# Patient Record
Sex: MALE | Race: WHITE | NOT HISPANIC OR LATINO | URBAN - METROPOLITAN AREA
[De-identification: names, ages, dates, MRNs, and addresses within clinical notes are randomized per-mention and may not be internally consistent; named-entity substitution may affect disease eponyms.]

---

## 2023-03-22 ENCOUNTER — OFFICE (OUTPATIENT)
Dept: URBAN - METROPOLITAN AREA CLINIC 105 | Facility: CLINIC | Age: 28
End: 2023-03-22

## 2023-03-22 VITALS
DIASTOLIC BLOOD PRESSURE: 58 MMHG | HEART RATE: 75 BPM | SYSTOLIC BLOOD PRESSURE: 109 MMHG | OXYGEN SATURATION: 98 % | HEIGHT: 69 IN | WEIGHT: 183 LBS

## 2023-03-22 DIAGNOSIS — K58.1 IRRITABLE BOWEL SYNDROME WITH CONSTIPATION: ICD-10-CM

## 2023-03-22 DIAGNOSIS — R10.9 UNSPECIFIED ABDOMINAL PAIN: ICD-10-CM

## 2023-03-22 DIAGNOSIS — R14.0 ABDOMINAL DISTENSION (GASEOUS): ICD-10-CM

## 2023-03-22 PROCEDURE — 99203 OFFICE O/P NEW LOW 30 MIN: CPT

## 2023-03-22 RX ORDER — FAMOTIDINE 40 MG/1
TABLET, FILM COATED ORAL
Qty: 90 | Refills: 1 | Status: ACTIVE
Start: 2023-03-22

## 2023-03-22 RX ORDER — HYOSCYAMINE SULFATE 0.12 MG/1
0.38 TABLET ORAL; SUBLINGUAL
Qty: 30 | Refills: 2 | Status: ACTIVE
Start: 2023-03-22

## 2023-03-22 NOTE — SERVICEHPINOTES
Misael Tatum   is seen for an initial visit today.  
br
brPatient is a 27-year-old man who presents today for evaluation of abdominal pain.brHe c/o left sided abdominal pain which is going on for about 4 weeks associated with bloating sensation. He experiences the pain especially after eating a meal. Patient feels like he cannot pass gas and it is trapped in there. His main issue is gaseous sensation, which exacerbates at night. Patient also feels rumbly in the stomach. He has issues with heartburn every day. He moves his bowels twice a day. Patient states his pressure mildly relieves after bowel movement. Patient reports the pain worsens when he is trying to go to bed. He has tried Gas-X, MiraLAX, and probiotic. Patient also tried bland diet for few days.brHe occasionally drinks alcohol. Does not smoke cigarettes. brDenies blood in stool. Denies family history of colon cancer. Denies family history of IBD, Crohn’s, and ulcerative colitis. Denies black or tarry stool. Denies trouble swallowing.

## 2023-03-22 NOTE — SERVICENOTES
– Ordered blood work
– Ordered breath test
– Try famotidine 40 mg daily as needed for heartburn
–Continue probiotic supplement daily
– Discussed low FODMAP diet for bloating
– Try IBgard (peppermint oil) as needed for bloating/abdominal pain, you can buy this in the drugstore or online if helpful
– If IBgard not helping, try hyoscyamine under the tongue every 8 hours as needed
-Discussed lifestyle modifications for reflux including eating small, frequent meals, elevating head of bed, not eating 3 hours before bedtime and avoiding trigger foods
-Placed referral to primary care
–Follow-up in 3 months

## 2023-10-30 ENCOUNTER — OFFICE (OUTPATIENT)
Dept: URBAN - METROPOLITAN AREA CLINIC 105 | Facility: CLINIC | Age: 28
End: 2023-10-30

## 2023-10-30 VITALS
OXYGEN SATURATION: 99 % | SYSTOLIC BLOOD PRESSURE: 115 MMHG | HEART RATE: 72 BPM | WEIGHT: 178 LBS | HEIGHT: 69 IN | DIASTOLIC BLOOD PRESSURE: 80 MMHG

## 2023-10-30 DIAGNOSIS — R14.0 ABDOMINAL DISTENSION (GASEOUS): ICD-10-CM

## 2023-10-30 DIAGNOSIS — R10.84 GENERALIZED ABDOMINAL PAIN: ICD-10-CM

## 2023-10-30 PROCEDURE — 99214 OFFICE O/P EST MOD 30 MIN: CPT

## 2023-10-30 NOTE — SERVICEHPINOTES
Misael Tatum   is seen today for a follow-up visit.     
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 He experiences the pain especially after eating a meal. Patient feels like he cannot pass gas and it is trapped in there. His main issue is gaseous sensation, which exacerbates at night. Patient also feels rumbly in the stomach. He has issues with heartburn every day. He moves his bowels twice a day. Patient states his pressure mildly relieves after bowel movement. Patient reports the pain worsens when he is trying to go to bed. He has tried Gas-X, MiraLAX, and probiotic. Patient also tried bland diet for few days.brHe occasionally drinks alcohol. Does not smoke cigarettes.brDenies blood in stool. Denies family history of colon cancer. Denies family history of IBD, Crohn’s, and ulcerative colitis. Denies black or tarry stool. Denies trouble swallowing.brINTERVAL VISIT 10-30-23Since last office visit, low FODMAP diet, famotidine as needed, probiotic, IBgard was recommendedbrH. pylori, celiac labs were ordered but not completed. Hyoscyamine prescription was sent. Reports hyoscyamine caused dizziness so he stopped. He did not have any labs completed. He is taking famotidine 40 mg daily. He is still bothered by acid reflux daily. He tried MiraLAX and probiotics but still reports feelings of incomplete evacuation. Feels like "gas is trapped "and he cannot get it out. Try low FODMAP diet intermittently here and there

## 2023-10-30 NOTE — SERVICENOTES
stop probiotic
stop famotidine
start omeprazole 20 mg daily, 30-60 min before breakfast
start LInzess 145, take 30 min before meal, one a day

## 2024-01-23 ENCOUNTER — OFFICE (OUTPATIENT)
Dept: URBAN - METROPOLITAN AREA CLINIC 81 | Facility: CLINIC | Age: 29
End: 2024-01-23

## 2024-01-23 VITALS
WEIGHT: 181 LBS | HEIGHT: 69 IN | DIASTOLIC BLOOD PRESSURE: 80 MMHG | OXYGEN SATURATION: 99 % | HEART RATE: 88 BPM | SYSTOLIC BLOOD PRESSURE: 135 MMHG

## 2024-01-23 DIAGNOSIS — R10.84 GENERALIZED ABDOMINAL PAIN: ICD-10-CM

## 2024-01-23 DIAGNOSIS — R14.0 ABDOMINAL DISTENSION (GASEOUS): ICD-10-CM

## 2024-01-23 PROCEDURE — 99213 OFFICE O/P EST LOW 20 MIN: CPT | Performed by: REGISTERED NURSE

## 2024-01-25 LAB
CELIAC AB TTG TIGA W/RFLX: IMMUNOGLOBULIN A, QN, SERUM: 253 MG/DL (ref 90–386)
CELIAC AB TTG TIGA W/RFLX: T-TRANSGLUTAMINASE (TTG) IGA: <2 U/ML

## 2024-02-15 ENCOUNTER — TELEHEALTH PROVIDED OTHER THAN IN PATIENT'S HOME (OUTPATIENT)
Dept: URBAN - METROPOLITAN AREA CLINIC 81 | Facility: CLINIC | Age: 29
End: 2024-02-15

## 2024-02-15 VITALS — WEIGHT: 178 LBS | HEIGHT: 69 IN

## 2024-02-15 DIAGNOSIS — E74.31 SUCRASE-ISOMALTASE DEFICIENCY: ICD-10-CM

## 2024-02-15 DIAGNOSIS — R14.0 ABDOMINAL DISTENSION (GASEOUS): ICD-10-CM

## 2024-02-15 DIAGNOSIS — R10.84 GENERALIZED ABDOMINAL PAIN: ICD-10-CM

## 2024-02-15 PROCEDURE — 99214 OFFICE O/P EST MOD 30 MIN: CPT | Mod: GT | Performed by: REGISTERED NURSE
